# Patient Record
Sex: FEMALE | Race: WHITE | NOT HISPANIC OR LATINO | URBAN - METROPOLITAN AREA
[De-identification: names, ages, dates, MRNs, and addresses within clinical notes are randomized per-mention and may not be internally consistent; named-entity substitution may affect disease eponyms.]

---

## 2019-07-26 ENCOUNTER — EMERGENCY (EMERGENCY)
Facility: HOSPITAL | Age: 44
LOS: 0 days | Discharge: HOME | End: 2019-07-26
Admitting: EMERGENCY MEDICINE
Payer: COMMERCIAL

## 2019-07-26 VITALS
SYSTOLIC BLOOD PRESSURE: 144 MMHG | TEMPERATURE: 98 F | DIASTOLIC BLOOD PRESSURE: 83 MMHG | OXYGEN SATURATION: 100 % | HEART RATE: 77 BPM | WEIGHT: 134.92 LBS | RESPIRATION RATE: 18 BRPM

## 2019-07-26 DIAGNOSIS — M25.561 PAIN IN RIGHT KNEE: ICD-10-CM

## 2019-07-26 DIAGNOSIS — M25.461 EFFUSION, RIGHT KNEE: ICD-10-CM

## 2019-07-26 DIAGNOSIS — Z88.2 ALLERGY STATUS TO SULFONAMIDES: ICD-10-CM

## 2019-07-26 DIAGNOSIS — M25.569 PAIN IN UNSPECIFIED KNEE: ICD-10-CM

## 2019-07-26 DIAGNOSIS — M79.672 PAIN IN LEFT FOOT: ICD-10-CM

## 2019-07-26 PROCEDURE — 99283 EMERGENCY DEPT VISIT LOW MDM: CPT

## 2019-07-26 PROCEDURE — 73562 X-RAY EXAM OF KNEE 3: CPT | Mod: 26,RT

## 2019-07-26 PROCEDURE — 73630 X-RAY EXAM OF FOOT: CPT | Mod: 26,LT

## 2019-07-26 RX ADMIN — Medication 500 MILLIGRAM(S): at 20:52

## 2019-07-26 NOTE — ED PROVIDER NOTE - PHYSICAL EXAMINATION
CONSTITUTIONAL: Well-appearing; well-nourished; in no apparent distress.   MS: +pain with rom right knee and swelling; +swelling to dorsal aspect of left foot and pain with flexion/extension; No evidence of trauma or deformity. otherwise normal ROM in all other joints/extremities; non-tender to palpation; distal pulses are normal.   SKIN: Normal for age and race; warm; dry; good turgor; no apparent lesions or exudate.   NEURO/PSYCH: A & O x 4; grossly unremarkable. mood and manner are appropriate. Grooming and personal hygiene are appropriate. No apparent thoughts of harm to self or others.

## 2019-07-26 NOTE — ED PROCEDURE NOTE - NS ED PERI NEURO NEG
Post-application: Motor, sensory, and vascular responses intact in the injured extremity./The patient/caregiver verbalized understanding of how to care for the injured extremity with splint/Pre-application: Motor, sensory, and vascular responses intact in the injured extremity.
Pre-application: Motor, sensory, and vascular responses intact in the injured extremity./Post-application: Motor, sensory, and vascular responses intact in the injured extremity./The patient/caregiver verbalized understanding of how to care for the injured extremity with splint

## 2019-07-26 NOTE — ED ADULT TRIAGE NOTE - CHIEF COMPLAINT QUOTE
Patient states she has water in her right knee, and left upper foot swelling. denies any injury or trauma to area.

## 2019-07-26 NOTE — ED PROVIDER NOTE - CARE PROVIDERS DIRECT ADDRESSES
[email or web address], [email or web address].\A [email or web address] [place] [email or web address]

## 2019-07-26 NOTE — ED PROVIDER NOTE - OBJECTIVE STATEMENT
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

## 2019-07-26 NOTE — ED PROVIDER NOTE - NSFOLLOWUPINSTRUCTIONS_ED_ALL_ED_FT
Knee Pain    Knee pain is a very common symptom and can have many causes. Knee pain often goes away when you follow your health care provider's instructions for relieving pain and discomfort at home. However, knee pain can develop into a condition that needs treatment. Some conditions may include:     Arthritis caused by wear and tear (osteoarthritis).  Arthritis caused by swelling and irritation (rheumatoid arthritis or gout).  A cyst or growth in your knee.  An infection in your knee joint.  An injury that will not heal.  Damage, swelling, or irritation of the tissues that support your knee (torn ligaments or tendinitis).    If your knee pain continues, additional tests may be ordered to diagnose your condition. Tests may include X-rays or other imaging studies of your knee. You may also need to have fluid removed from your knee. Treatment for ongoing knee pain depends on the cause, but treatment may include:    Medicines to relieve pain or swelling.  Steroid injections in your knee.  Physical therapy.  Surgery.    HOME CARE INSTRUCTIONS  Take medicines only as directed by your health care provider.   Rest your knee and keep it raised (elevated) while you are resting.  Do not do things that cause or worsen pain.  Avoid high-impact activities or exercises, such as running, jumping rope, or doing jumping jacks.  Apply ice to the knee area:  Put ice in a plastic bag.  Place a towel between your skin and the bag.  Leave the ice on for 20 minutes, 2–3 times a day.  Ask your health care provider if you should wear an elastic knee support.  Keep a pillow under your knee when you sleep.  Lose weight if you are overweight. Extra weight can put pressure on your knee.  Do not use any tobacco products, including cigarettes, chewing tobacco, or electronic cigarettes. If you need help quitting, ask your health care provider. Smoking may slow the healing of any bone and joint problems that you may have.    SEEK MEDICAL CARE IF:  Your knee pain continues, changes, or gets worse.  You have a fever along with knee pain.  Your knee gorge or locks up.  Your knee becomes more swollen.    SEEK IMMEDIATE MEDICAL CARE IF:  Your knee joint feels hot to the touch.  You have chest pain or trouble breathing.    ADDITIONAL NOTES AND INSTRUCTIONS    Please follow up with your Primary MD in 24-48 hr.  Seek immediate medical care for any new/worsening signs or symptoms.   Foot Pain    Many things can cause foot pain. Some common causes are:     An injury.  A sprain.  Arthritis.  Blisters.  Bunions.    HOME CARE INSTRUCTIONS  Pay attention to any changes in your symptoms. Take these actions to help with your discomfort:    If directed, put ice on the affected area:  Put ice in a plastic bag.  Place a towel between your skin and the bag.  Leave the ice on for 15–20 minutes, 3?4 times a day for 2 days.  Take over-the-counter and prescription medicines only as told by your health care provider.  Wear comfortable, supportive shoes that fit you well. Do not wear high heels.  Do not stand or walk for long periods of time.  Do not lift a lot of weight. This can put added pressure on your feet.  Do stretches to relieve foot pain and stiffness as told by your health care provider.  Rub your foot gently.  Keep your feet clean and dry.    SEEK MEDICAL CARE IF:  Your pain does not get better after a few days of self-care.  Your pain gets worse.  You cannot stand on your foot.    SEEK IMMEDIATE MEDICAL CARE IF:  Your foot is numb or tingling.  Your foot or toes are swollen.  Your foot or toes turn white or blue.  You have warmth and redness along your foot.    ADDITIONAL NOTES AND INSTRUCTIONS    Please follow up with your Primary MD in 24-48 hr.  Seek immediate medical care for any new/worsening signs or symptoms.

## 2019-07-26 NOTE — ED PROVIDER NOTE - CARE PROVIDER_API CALL
Brandon Mcrae (MD)  Orthopaedic Surgery  [address] [place] [address]  Phone: [phone]  Fax: [phone]  Follow Up Time:

## 2019-07-26 NOTE — ED PROCEDURE NOTE - CPROC ED TIME OUT STATEMENT1
“Patient's name, , procedure and correct site were confirmed during the [place] Timeout.”
“Patient's name, , procedure and correct site were confirmed during the [place] Timeout.”

## 2019-07-26 NOTE — ED PROCEDURE NOTE - NS ED PERI VASCULAR NEG
no swelling/no paresthesia/no cyanosis of extremity/capillary refill time < 2 seconds/fingers/toes warm to touch
no paresthesia/no swelling/no cyanosis of extremity/capillary refill time < 2 seconds/fingers/toes warm to touch

## 2019-07-26 NOTE — ED PROCEDURE NOTE - CPROC ED POST PROC CARE GUIDE1
Elevate the injured extremity as instructed./Keep the cast/splint/dressing clean and dry./Instructed patient/caregiver to follow-up with primary care physician./Verbal/written post procedure instructions were given to patient/caregiver./Instructed patient/caregiver regarding signs and symptoms of infection.
Keep the cast/splint/dressing clean and dry./Instructed patient/caregiver to follow-up with primary care physician./Instructed patient/caregiver regarding signs and symptoms of infection./Elevate the injured extremity as instructed./Verbal/written post procedure instructions were given to patient/caregiver.

## 2020-02-14 ENCOUNTER — TRANSCRIPTION ENCOUNTER (OUTPATIENT)
Age: 45
End: 2020-02-14

## 2021-09-18 ENCOUNTER — TRANSCRIPTION ENCOUNTER (OUTPATIENT)
Age: 46
End: 2021-09-18

## 2022-06-15 PROBLEM — Z78.9 OTHER SPECIFIED HEALTH STATUS: Chronic | Status: ACTIVE | Noted: 2019-07-26

## 2022-06-27 ENCOUNTER — NON-APPOINTMENT (OUTPATIENT)
Age: 47
End: 2022-06-27

## 2022-07-06 ENCOUNTER — APPOINTMENT (OUTPATIENT)
Dept: ORTHOPEDIC SURGERY | Facility: CLINIC | Age: 47
End: 2022-07-06

## 2022-07-19 ENCOUNTER — FORM ENCOUNTER (OUTPATIENT)
Age: 47
End: 2022-07-19

## 2022-08-23 ENCOUNTER — APPOINTMENT (OUTPATIENT)
Dept: PAIN MANAGEMENT | Facility: CLINIC | Age: 47
End: 2022-08-23

## 2022-08-24 ENCOUNTER — APPOINTMENT (OUTPATIENT)
Dept: ORTHOPEDIC SURGERY | Facility: CLINIC | Age: 47
End: 2022-08-24

## 2022-08-25 ENCOUNTER — APPOINTMENT (OUTPATIENT)
Dept: PAIN MANAGEMENT | Facility: CLINIC | Age: 47
End: 2022-08-25

## 2022-08-30 ENCOUNTER — APPOINTMENT (OUTPATIENT)
Dept: PAIN MANAGEMENT | Facility: CLINIC | Age: 47
End: 2022-08-30

## 2022-10-05 ENCOUNTER — APPOINTMENT (OUTPATIENT)
Dept: ORTHOPEDIC SURGERY | Facility: CLINIC | Age: 47
End: 2022-10-05

## 2022-10-05 ENCOUNTER — NON-APPOINTMENT (OUTPATIENT)
Age: 47
End: 2022-10-05

## 2022-10-05 VITALS — BODY MASS INDEX: 20.94 KG/M2 | HEIGHT: 67.5 IN | WEIGHT: 135 LBS

## 2022-10-05 DIAGNOSIS — Z00.00 ENCOUNTER FOR GENERAL ADULT MEDICAL EXAMINATION W/OUT ABNORMAL FINDINGS: ICD-10-CM

## 2022-10-05 PROCEDURE — 99072 ADDL SUPL MATRL&STAF TM PHE: CPT | Mod: ACP

## 2022-10-05 PROCEDURE — 73110 X-RAY EXAM OF WRIST: CPT | Mod: ACP,LT

## 2022-10-05 PROCEDURE — 99213 OFFICE O/P EST LOW 20 MIN: CPT | Mod: ACP

## 2022-10-05 NOTE — PHYSICAL EXAM
[de-identified] :   Physical exam of her left wrist:  Negative swelling or ecchymosis.  Nontender over the distal radius or distal ulna.  Negative anatomical snuffbox tenderness.  She has some mild pain over the TFCC and ECU tendon.  She has full range of motion of the wrist with mild pain with ulnar deviation.   strength is 5/5.  Sensory and motor are intact.

## 2022-10-05 NOTE — WORK
[Mild Partial] : mild partial [Does not reveal pre-existing condition(s) that may affect treatment/prognosis] : does not reveal pre-existing condition(s) that may affect treatment/prognosis [Cannot return to work because ________] : cannot return to work because [unfilled] [Patient] : patient [No Rx restrictions] : No Rx restrictions. [I provided the services listed above] :  I provided the services listed above. [FreeTextEntry1] : good [FreeTextEntry3] : PEPPER

## 2022-10-05 NOTE — DISCUSSION/SUMMARY
[de-identified] :   She is almost a year status post her injury.\par She is still having ulnar-sided pain.  She has been doing therapy.\par I recommend an MRI to rule out a TFCC tear.  \par I recommend a wrist widget to try to alleviate some of her symptoms.\par She will see Dr. Ferreira for repeat evaluation in about a month after the MRI is completed.\par She has a mild partial disability secondary to wrist, however she is currently totally disabled secondary to her neck as being treated by Dr. Ley.\par All questions were answered today.

## 2022-10-05 NOTE — DATA REVIEWED
[FreeTextEntry1] :   X-rays repeated in the office today of her left wrist show a chronic healing avulsion fracture of the ulna styloid.

## 2022-10-05 NOTE — HISTORY OF PRESENT ILLNESS
[de-identified] :   Patient is a 47-year-old female here for repeat evaluation of her left wrist.  She is almost 1 year  status post ulnar styloid fracture that occurred at work.  She is having continued pain with certain motions on the ulnar side of the wrist.  She has been doing therapy with minimal relief.  She has been back and forth between [place] and [place] since her mother is sick in [place].

## 2022-10-06 ENCOUNTER — APPOINTMENT (OUTPATIENT)
Dept: PAIN MANAGEMENT | Facility: CLINIC | Age: 47
End: 2022-10-06

## 2022-10-06 PROCEDURE — 93770 DETERMINATION VENOUS PRESS: CPT

## 2022-10-06 PROCEDURE — 93040 RHYTHM ECG WITH REPORT: CPT | Mod: 59

## 2022-10-06 PROCEDURE — 72040 X-RAY EXAM NECK SPINE 2-3 VW: CPT

## 2022-10-06 PROCEDURE — 99072 ADDL SUPL MATRL&STAF TM PHE: CPT

## 2022-10-06 PROCEDURE — 99152Z: CUSTOM

## 2022-10-06 PROCEDURE — 62321 NJX INTERLAMINAR CRV/THRC: CPT

## 2022-10-06 NOTE — PROCEDURE
[FreeTextEntry1] : CERVICAL EPIDURAL STEROID INJECTION   [FreeTextEntry3] : Date:  10/06/2022\par \par Patient: JONATHAN HOPE\par \par :  1975\par \par \par \par \par \par Preoperative Diagnosis: Cervical radiculopathy\par Postoperative Diagnosis: Cervical radiculopathy\par \par \par Procedure:\par 1. Interlaminar C7-T1 Cervical Epidural Injection under fluoroscopy\par 2. Epidurography\par 3. Fluoroscopic guidance and localization of needle\par \par \par Physician: Ravinder Ley M.D. \par \par Anesthesia: see nurses notes, IV sedation , Versed 2mg, Fentanyl 50mcg\par \par \par Medical Necessity:  Failure of conservative management.\par Indication for Fluoroscopy:  This procedure requires the precise placement of the spinal needle into the epidural space.  It is the only way to accurately and safely perform the injection.\par Consent:  Though unusual, the possible complications including infection, bleeding, nerve damage, hospital admission, stroke, pneumothorax, death or failure of the procedure are theoretically possible. The patient was educated about the of the procedure and alternative therapies. All questions were answered and the patient freely gave consent to proceed.\par The patient was also told that sometimes patients will notice upper and/or lower extremity weakness immediately following the procedure due to extravasation of local anesthetic solution onto the main nerve root during the procedure. In addition, the patient was informed of other possible complications such as phrenic nerve injury, weak/heavy head, or muscle injury.  Lastly, the patient was informed that 1 to 2 weeks of perioperative discomfort following the procedure is to be expected.\par \par \par Monitoring:  Patient had continuous blood pressure, EKG, and pulse oximetry throughout the case. See nurse's notes.\par \par \par \par PROCEDURE NOTE: After obtaining written consent, the patient was positioned prone on the operating table. The back to her neck and upper thorax was prepped with Betadine and draped in usual sterile fashion.  A time out was performed.  The C7-T1 interspace was identified using fluoroscopy. The skin was infiltrated with lidocaine 2% -- 1 cc for subcutaneous analgesia.  The epidural space was identified using a 18g touhy needle with a midline approach using a loss of resistance technique. 2cc omnipaque was used to define the space. A solution of 5 ml of preservative-free sterile saline and 1ml of dexamethasone 10mg, 1cc was infused with minimal pressure on the syringe into the epidural space.  The needle tract and tubing were cleared with 2ml of preservative free normal saline. The procedure was tolerated well. There was no evidence of CSF, Paresthesias nor heme. \par \par \par Epidurogram: Distal and proximal spread was noted.\par Findings: Cervical Spine AP and oblique views with x-ray degenerative changes noted.\par \par Complications: none. \par \par Disposition: I have examined the patient and there are no new physical findings since original presentation. The patient was discharged home with a . The discharge instruction sheet was given to the patient. Motor function was intact.\par \par \par \par Comment: 1st LISBETH today, depending on effectiveness would schedule 2nd LISBETH in 1-2 weeks vs caudal epidural steroid vs follow up in office. Call if any problems\par \par \par \par This document was signed by:\par \par Ravinder Ley MD \par Board Certified, Anesthesiology \par Board Certified, Pain Medicine \par \par

## 2022-11-08 ENCOUNTER — APPOINTMENT (OUTPATIENT)
Dept: ORTHOPEDIC SURGERY | Facility: CLINIC | Age: 47
End: 2022-11-08

## 2022-11-08 DIAGNOSIS — S52.615D NONDISPLACED FRACTURE OF LEFT ULNA STYLOID PROCESS, SUBSEQUENT ENCOUNTER FOR CLOSED FRACTURE WITH ROUTINE HEALING: ICD-10-CM

## 2022-11-08 PROCEDURE — 99214 OFFICE O/P EST MOD 30 MIN: CPT

## 2022-11-08 PROCEDURE — 99072 ADDL SUPL MATRL&STAF TM PHE: CPT

## 2022-11-09 PROBLEM — S52.615D CLOSED NONDISPLACED FRACTURE OF STYLOID PROCESS OF LEFT ULNA WITH ROUTINE HEALING, SUBSEQUENT ENCOUNTER: Status: ACTIVE | Noted: 2022-10-05

## 2022-11-09 NOTE — ASSESSMENT
[FreeTextEntry1] : Patient comes for follow-up of her left wrist.  She says she is doing better.  Her pain is a lot better than prior.  She has pain on the ulnar aspect of her wrist at times.  There is only with certain things that bother her.  She states that she is doing relatively well.  She is currently not working because of her neck.\par \par   Left wrist:  nontender to palpation over SL, LT, slightly tender will patient over TFCC,   no pain with ulnar deviation, mild pain with supination, no pain with shucking of the DRUJ, full range of motion of fingers, neurovascularly intact\par \par   The MRI of the wrist shows mild ulnar-sided edema, possible ulnar styloid fracture, DRUJ effusion, mild arthritis\par \par  patient has some mild ulnar-sided wrist pain.  It is getting significantly better.  She says she does not have pain at all times.  She is going to continue dealing with that as needed.  She will follow up with me for this is needed.  She is currently not working secondary to her neck.

## 2022-11-09 NOTE — WORK
[Mild Partial] : mild partial [Cannot return to work because ________] : cannot return to work because [unfilled] [I provided the services listed above] :  I provided the services listed above. [FreeTextEntry1] : fair [FreeTextEntry3] : lg

## 2022-11-10 ENCOUNTER — APPOINTMENT (OUTPATIENT)
Dept: PAIN MANAGEMENT | Facility: CLINIC | Age: 47
End: 2022-11-10

## 2022-11-10 VITALS — HEART RATE: 83 BPM | DIASTOLIC BLOOD PRESSURE: 106 MMHG | SYSTOLIC BLOOD PRESSURE: 168 MMHG

## 2022-11-10 PROCEDURE — 99215 OFFICE O/P EST HI 40 MIN: CPT

## 2022-11-10 PROCEDURE — 99072 ADDL SUPL MATRL&STAF TM PHE: CPT

## 2022-11-10 NOTE — PHYSICAL EXAM
[de-identified] : Constitutional\par GENERAL APPEARANCE OF PATIENT IS WELL DEVELOPED, WELL NOURISHED, BODY HABITUS NORMAL, WELL GROOMED, NO DEFORMITIES NOTED. \par \par Head\par -          Atraumatic and Normocephalic \par \par Eyes, Nose, and Throat:\par -          External inspection of ears and nose are normal overall without scars, lesions, or masses noted\par -          Assessment of hearing is normal\par \par Neck\par -          Examination of neck shows no masses, overall appearance is normal, neck is symmetric, tracheal position is midline, no crepitus is noted\par -          Examination of thyroid shows no enlargement, tenderness or masses\par \par Respiratory\par -          Assessment of respiratory effort shows no intercostal retractions, no use of accessory muscles, unlabored breathing, and normal diaphragmatic movement.\par \par Cardiovascular\par -          Examination of extremities show no edema or varicosities\par \par Musculoskeletal\par -           Inspection and palpation of digits and nails shows no clubbing, cyanosis, nodules, drainage, fluctuance, petechiae\par \par 1)         Spine- inspection and palpation shows no misalignment, asymmetry, crepitation, defects, tenderness, masses, effusions. ROM is normal without crepitation or contracture. No instability or subluxation or laxity is noted. No abnormal movements.\par \par 2)         Neck- tenderness into the cervical paraspinals. ROM Restricted. Pain with flexion. Positive spurling bilaterally. \par \par 3)         RUE- inspection and palpation shows no misalignment, asymmetry, crepitation, defects, tenderness, masses, effusions. ROM is normal without crepitation or contracture. No instability or subluxation or laxity is noted. No abnormal movements.\par \par 4)         LUE-inspection and palpation shows no misalignment, asymmetry, crepitation, defects, tenderness, masses, effusions. ROM is normal without crepitation or contracture. No instability or subluxation or laxity is noted. No abnormal movements.\par \par 5)         RLE- inspection and palpation shows no misalignment, asymmetry, crepitation, defects, tenderness, masses, effusions. ROM is normal without crepitation or contracture. No instability or subluxation or laxity is noted. No abnormal movements.\par \par 6)         LLE-inspection and palpation shows no misalignment, asymmetry, crepitation, defects, tenderness, masses, effusions. ROM is normal without crepitation or contracture. No instability or subluxation or laxity is noted. No abnormal movements.\par \par Skin\par -           Inspection of skin and subcutaneous tissue shows no rashes, lesions or ulcers\par -           Palpation of skin and subcutaneous tissue shows no rashes, no indurations, subcutaneous nodules or tightening.\par \par  Abdomen\par -           Soft; Non-tender\par \par Neurologic\par -           CN 2-12 are grossly intact\par -           No sensory or motor deficits in the upper and lower extremities\par -           Adequate strength in upper and lower extremities\par \par Psychiatric\par -           Patients judgment and insight are intact\par -           Oriented to time, place and person\par -           Recent and remote memory intact.\par
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
## 2022-11-10 NOTE — ASSESSMENT
[FreeTextEntry1] : 47 year old female presenting with ongoing severe cervical radicular pain. Patient is presenting with radicular pain with impairment in ADLs and functionality.  The pain has not responded to conservative care, including medications, stretching, as well as active modalities, such as physical therapy.  Imaging studies as well as physical exam findings corroborate the symptomatology and radicular pain.  We will proceed with an epidural at this point. I will proceed with a cervical epidural steroid injection with sedation. I will also have her see Dr Zapata for surgical input. Follow up in 6 weeks will be made for reassessment.\par \par Patient had a MRI that shows a radicular component along with pain referred into the upper extremity. Patient has trialed rehab (Home exercise, physical therapy or chiropractic care) and medications. I will schedule a C7-T1 cervical epidural steroid injection.\par \par Risk, benefits, pros and cons of procedure were explained to the patient using models and diagrams and their questions were answered. \par \par \par The patient has severe anxiety of procedures that necessitates monitored anesthesia care (MAC). The procedure performed will be close to major nerves, arteries, and spinal cord and/or joint structures. Due to the proximity of these structures, we need the patient to be still during the procedure.  With the help of MAC, this will be safely achieved and decrease the risk of any complications.\par \par Disability status:\par Temporary total disability. Patient is unable to return to work at this time.\par \par Entered by Payal Nieto, acting as scribe for Dr. Ley.\par  \par The documentation recorded by the scribe, in my presence, accurately reflects the service I personally performed, and the decisions made by me with my edits as appropriate.\par  \par Best Regards, \par Ravinder Ley MD \par Board Certified, Anesthesiology \par Board Certified, Pain Medicine\par \par \par

## 2022-11-10 NOTE — DATA REVIEWED
[FreeTextEntry1] : MRI of the cervical spine taken on 1- showed left paracentral broad-based disc herniation C3-4 indenting the thecal sac and foramina, abutting the exiting bilateral C4 nerve roots. Left paracentral broad-based disc herniation at C7-T1 indenting the thecal sac and foramina, abutting the exiting left-sided nerve root. Disc bulge at C4-5 causing spinal canal stenosis, cord impingement, and foraminal narrowing, abutting the exiting C5 nerve roots with myelomalacia. Disc bulges at C5-6 and C6-7 causing spinal canal stenosis and foraminal narrowing, abutting the exiting bilateral C6 and C7 nerve roots. Straightening of the normal lordosis, which may be secondary to spasm.\par \par SOAPP: Scored a __ , __ risk.\par  \par [place] REGISTRY: Reviewed .  \par  \par UDS: No data obtained today. \par  \par Medications that trigger a UDS: Benzodiazepines (Ativan, Xanax, Valium) etc, Barbiturates, Narcotics (Avinza, Butrans, hydrocodone, Codeine, Negin, Methadone, Morphine, MS Contin, Opana, oxycodone, Oxycontin, Suboxone etc), Pregabalin (Lyrica), Tramadol (Ultacet, Utram etc), Tapentadol, (Nucynta) and Elist Drugs (cocaine, THC, Etc.)\par  \par Risk factors: Bipolar Illness, positive for any an illicit drugs, history of any ETOH and drug abuse, any signs of diversion, Sharing Meds, selling meds. Non consistent [place] drug reporting and above 120meq of morphine\par  \par Low risk: Patient has combination of a low risk SOAP and no risk factors. UDS would be repeated randomly every quarter\par

## 2022-11-21 ENCOUNTER — FORM ENCOUNTER (OUTPATIENT)
Age: 47
End: 2022-11-21

## 2023-01-03 ENCOUNTER — NON-APPOINTMENT (OUTPATIENT)
Age: 48
End: 2023-01-03

## 2023-01-03 ENCOUNTER — APPOINTMENT (OUTPATIENT)
Dept: NEUROSURGERY | Facility: CLINIC | Age: 48
End: 2023-01-03
Payer: OTHER MISCELLANEOUS

## 2023-01-03 PROCEDURE — 99204 OFFICE O/P NEW MOD 45 MIN: CPT

## 2023-01-03 PROCEDURE — 99072 ADDL SUPL MATRL&STAF TM PHE: CPT
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
## 2023-01-03 NOTE — ASSESSMENT
[FreeTextEntry1] : 46 yo F presents for neurosurgical consultation with regards to cervical radicular complaints stemming from a work related injury as mentioned above.\par \par Updated MR C spine warranted for my review. Films provided for review are 1 year old. Also, the films are very difficult to interpret considering the presence of motion artifact.\par \par She has been encouraged to consider a repeat cervical epidural as this form of treatment has provided relief in the past. Conservative efforts can continue as patient found relief through chiropractic manipulation in the past.\par \par Patient is to return to the office in 4-6 weeks to review films and to devise a treatment plan moving forward.\par \par Honey Graves PA-C

## 2023-01-04 RX ORDER — TIZANIDINE 4 MG/1
4 TABLET ORAL
Qty: 30 | Refills: 3 | Status: ACTIVE | COMMUNITY
Start: 2022-10-06 | End: 1900-01-01

## 2023-01-12 ENCOUNTER — FORM ENCOUNTER (OUTPATIENT)
Age: 48
End: 2023-01-12

## 2023-01-31 ENCOUNTER — APPOINTMENT (OUTPATIENT)
Dept: PAIN MANAGEMENT | Facility: CLINIC | Age: 48
End: 2023-01-31

## 2023-02-07 RX ORDER — METHYLPREDNISOLONE 4 MG/1
4 TABLET ORAL
Qty: 1 | Refills: 0 | Status: ACTIVE | COMMUNITY
Start: 2023-02-07 | End: 1900-01-01

## 2023-03-02 ENCOUNTER — RESULT REVIEW (OUTPATIENT)
Age: 48
End: 2023-03-02

## 2023-03-02 ENCOUNTER — OUTPATIENT (OUTPATIENT)
Dept: OUTPATIENT SERVICES | Facility: HOSPITAL | Age: 48
LOS: 1 days | End: 2023-03-02
Payer: COMMERCIAL

## 2023-03-02 ENCOUNTER — APPOINTMENT (OUTPATIENT)
Dept: PAIN MANAGEMENT | Facility: CLINIC | Age: 48
End: 2023-03-02
Payer: OTHER MISCELLANEOUS

## 2023-03-02 VITALS — WEIGHT: 135 LBS | HEIGHT: 67.5 IN | BODY MASS INDEX: 20.94 KG/M2

## 2023-03-02 DIAGNOSIS — M54.12 RADICULOPATHY, CERVICAL REGION: ICD-10-CM

## 2023-03-02 PROCEDURE — 99072 ADDL SUPL MATRL&STAF TM PHE: CPT

## 2023-03-02 PROCEDURE — 72141 MRI NECK SPINE W/O DYE: CPT | Mod: 26

## 2023-03-02 PROCEDURE — 72141 MRI NECK SPINE W/O DYE: CPT

## 2023-03-02 PROCEDURE — 99215 OFFICE O/P EST HI 40 MIN: CPT

## 2023-03-02 NOTE — DATA REVIEWED
[FreeTextEntry1] : \par [id number]  \par \par [id number] MRI of the cervical spine taken on 1- showed left paracentral broad-based disc herniation C3-4 indenting the thecal sac and foramina, abutting the exiting bilateral C4 nerve roots. Left paracentral broad-based disc herniation at C7-T1 indenting the thecal sac and foramina, abutting the exiting left-sided nerve root. Disc bulge at C4-5 causing spinal canal stenosis, cord impingement, and foraminal narrowing, abutting the exiting C5 nerve roots with myelomalacia. Disc bulges at C5-6 and C6-7 causing spinal canal stenosis and foraminal narrowing, abutting the exiting bilateral C6 and C7 nerve roots. Straightening of the normal lordosis, which may be secondary to spasm.\par

## 2023-03-02 NOTE — ASSESSMENT
[FreeTextEntry1] : Patient with severe cervical radiculopathy.  She is currently awaiting a new MRI of the cervical spine as per neurosurgery.  We will follow up with her after MRI is completed and she has seen Dr. Wilkerson.\par \par She is temporarily totally disabled.  She is 100% disabled.  She is not working\par \par Follow-up in 2 months

## 2023-03-02 NOTE — HISTORY OF PRESENT ILLNESS
[FreeTextEntry1] : She continues today with ongoing severe cervical radicular pain. She states the pain radiates into the bilateral upper extremities with associated numbness, tingling and spasms. She states she has pain which still radiates into the hands. She currently rates the pain at a 8/10 on the pain scale. She states the pain is constant in nature. Has seen NSx.  She states updated MRI pending.

## 2023-03-02 NOTE — PHYSICAL EXAM
[de-identified] : Neck- tenderness into the cervical paraspinals. ROM Restricted. Pain with flexion. Positive spurling bilaterally. \par

## 2023-03-02 NOTE — REASON FOR VISIT
[Follow-Up Visit] : a follow-up pain management visit [FreeTextEntry2] : BACK , NECK KNEE PAIN FOLLOW UP

## 2023-03-03 DIAGNOSIS — M54.12 RADICULOPATHY, CERVICAL REGION: ICD-10-CM

## 2023-04-26 ENCOUNTER — APPOINTMENT (OUTPATIENT)
Dept: NEUROSURGERY | Facility: CLINIC | Age: 48
End: 2023-04-26
Payer: OTHER MISCELLANEOUS

## 2023-04-26 PROCEDURE — 99442: CPT

## 2023-04-26 NOTE — REASON FOR VISIT
[Other Location: e.g. School (Enter Location, City,State)___] : at [unfilled], at the time of the visit. [Medical Office: ([facility])___] : at the medical office located in  [Verbal consent obtained from patient] : the patient, [unfilled] [Follow-Up: _____] : a [unfilled] follow-up visit

## 2023-04-26 NOTE — HISTORY OF PRESENT ILLNESS
[FreeTextEntry1] : This is a 47 yo F who presents for neurosurgical telemedicine encounter, MR WAN spine review. She is aware that there is evidence of spondylitic changes along with retrolisthesis at C6-7. Moderate-severe neuroforaminal narrowing noted at C4-7.\par \par Continued neck pain reported with associated radicular features into the bilateral upper extremities traveling into the 1-2 digits of the hands. She finds it difficult to remain seated due to heightened pain and often prefers to stand to alleviate the burden of her discomfort.\par \par Historically, she has done well with epidural procedures with the first epidural providing excellent sustained benefit, the second injection delivered less relief.\par \par MR WAN spine 3/2/2023- Multilevel cervical spondylosis, worse at C4-5 through C6-7 with moderate spinal stenosis and severe bilateral foraminal stenosis.\par \par Mild retrolisthesis of C6 on C7.\par \par PHYSICAL EXAM: not completed due to the virtual nature of our encounter.\par

## 2023-04-26 NOTE — ASSESSMENT
[FreeTextEntry1] : This is a 47 yo F who underwent a neurosurgical telemedicine encounter with regards to MR C Spine imaging review. She is aware of the spondylitic changes on imaging and evidence of moderate-severe neuroforaminal narrowing at C4-7. Considering the evidence of retrolisthesis at C6-7 she would be advised to proceed with a x-ray C spine flex/ext.\par \par She will be revisiting with Dr. Ley over the coming week and will consider a repeat LISBETH x1. \par \par After the above mentioned injection and imaging have been completed she will return to our office to discuss her complaints and to devise a continued treatment plan moving forward. All questions/concerns have been addressed and she is agreeable with the proposed course of action.\par \par Honey Graves PA-C

## 2023-05-08 ENCOUNTER — NON-APPOINTMENT (OUTPATIENT)
Age: 48
End: 2023-05-08

## 2023-05-22 ENCOUNTER — APPOINTMENT (OUTPATIENT)
Dept: PAIN MANAGEMENT | Facility: CLINIC | Age: 48
End: 2023-05-22
Payer: OTHER MISCELLANEOUS

## 2023-05-22 ENCOUNTER — OUTPATIENT (OUTPATIENT)
Dept: OUTPATIENT SERVICES | Facility: HOSPITAL | Age: 48
LOS: 1 days | End: 2023-05-22
Payer: COMMERCIAL

## 2023-05-22 ENCOUNTER — FORM ENCOUNTER (OUTPATIENT)
Age: 48
End: 2023-05-22

## 2023-05-22 VITALS
DIASTOLIC BLOOD PRESSURE: 108 MMHG | SYSTOLIC BLOOD PRESSURE: 150 MMHG | HEIGHT: 67 IN | BODY MASS INDEX: 21.19 KG/M2 | WEIGHT: 135 LBS | HEART RATE: 73 BPM

## 2023-05-22 DIAGNOSIS — M54.12 RADICULOPATHY, CERVICAL REGION: ICD-10-CM

## 2023-05-22 DIAGNOSIS — M50.00 CERVICAL DISC DISORDER WITH MYELOPATHY, UNSPECIFIED CERVICAL REGION: ICD-10-CM

## 2023-05-22 PROCEDURE — 72052 X-RAY EXAM NECK SPINE 6/>VWS: CPT | Mod: 26

## 2023-05-22 PROCEDURE — 72052 X-RAY EXAM NECK SPINE 6/>VWS: CPT

## 2023-05-22 PROCEDURE — 99215 OFFICE O/P EST HI 40 MIN: CPT

## 2023-05-22 NOTE — ASSESSMENT
[FreeTextEntry1] : 48 year old female presenting with severe cervical radiculopathy. Patient is presenting with radicular pain with impairment in ADLs and functionality.  The pain has not responded to conservative care, including medications, stretching, as well as active modalities, such as physical therapy.  Imaging studies as well as physical exam findings corroborate the symptomatology and radicular pain.  We will proceed with an epidural at this point. Follow up in 6 weeks will be made for reassessment. I have explained the findings to the patient and all questions have been answered. \par \par Patient had a MRI that shows a radicular component along with pain referred into the upper extremity. Patient has trialed rehab (Home exercise, physical therapy or chiropractic care) and medications. I will schedule a C7-T1 cervical epidural steroid injection.\par \par Risk, benefits, pros and cons of procedure were explained to the patient using models and diagrams and their questions were answered. \par \par \par The patient has severe anxiety of procedures that necessitates monitored anesthesia care (MAC). The procedure performed will be close to major nerves, arteries, and spinal cord and/or joint structures. Due to the proximity of these structures, we need the patient to be still during the procedure.  With the help of MAC, this will be safely achieved and decrease the risk of any complications.\par \par Disability status:\par Temporary total disability. Patient is unable to return to work at this time.\par \par Entered by Payal Nieto, acting as scribe for Dr. Ley.\par  \par The documentation recorded by the scribe, in my presence, accurately reflects the service I personally performed, and the decisions made by me with my edits as appropriate.\par  \par Best Regards, \par Ravinder Ley MD \par Board Certified, Anesthesiology \par Board Certified, Pain Medicine\par

## 2023-05-22 NOTE — HISTORY OF PRESENT ILLNESS
[FreeTextEntry1] : TODAY: Revisit encounter.\par \par She continues today with ongoing severe cervical radicular pain. She states the pain radiates into the bilateral upper extremities with associated numbness, tingling and spasms. She states she has pain which still radiates into the hands. She currently rates the pain at a 8/10 on the pain scale. She states the pain is constant in nature. Has seen NSx.

## 2023-05-22 NOTE — PHYSICAL EXAM
[de-identified] : Neck- tenderness into the cervical paraspinals. ROM Restricted. Pain with flexion. Positive spurling bilaterally. \par

## 2023-05-22 NOTE — DATA REVIEWED
[FreeTextEntry1] : MR C spine 3/2/2023- Multilevel cervical spondylosis, worse at C4-5 through C6-7 with moderate spinal stenosis and severe bilateral foraminal stenosis.\par \par Mild retrolisthesis of C6 on C7.\par \par  MRI of the cervical spine taken on 1- showed left paracentral broad-based disc herniation C3-4 indenting the thecal sac and foramina, abutting the exiting bilateral C4 nerve roots. Left paracentral broad-based disc herniation at C7-T1 indenting the thecal sac and foramina, abutting the exiting left-sided nerve root. Disc bulge at C4-5 causing spinal canal stenosis, cord impingement, and foraminal narrowing, abutting the exiting C5 nerve roots with myelomalacia. Disc bulges at C5-6 and C6-7 causing spinal canal stenosis and foraminal narrowing, abutting the exiting bilateral C6 and C7 nerve roots. Straightening of the normal lordosis, which may be secondary to spasm.\par

## 2023-05-23 DIAGNOSIS — M54.12 RADICULOPATHY, CERVICAL REGION: ICD-10-CM

## 2023-05-23 DIAGNOSIS — M50.00 CERVICAL DISC DISORDER WITH MYELOPATHY, UNSPECIFIED CERVICAL REGION: ICD-10-CM

## 2023-07-24 ENCOUNTER — APPOINTMENT (OUTPATIENT)
Dept: PAIN MANAGEMENT | Facility: CLINIC | Age: 48
End: 2023-07-24

## 2023-10-17 ENCOUNTER — APPOINTMENT (OUTPATIENT)
Dept: PAIN MANAGEMENT | Facility: CLINIC | Age: 48
End: 2023-10-17
Payer: OTHER MISCELLANEOUS

## 2023-10-17 VITALS
HEIGHT: 67 IN | WEIGHT: 135 LBS | DIASTOLIC BLOOD PRESSURE: 95 MMHG | BODY MASS INDEX: 21.19 KG/M2 | SYSTOLIC BLOOD PRESSURE: 165 MMHG | HEART RATE: 73 BPM

## 2023-10-17 PROCEDURE — 99215 OFFICE O/P EST HI 40 MIN: CPT

## 2023-10-19 ENCOUNTER — APPOINTMENT (OUTPATIENT)
Dept: PAIN MANAGEMENT | Facility: CLINIC | Age: 48
End: 2023-10-19
Payer: OTHER MISCELLANEOUS

## 2023-10-19 ENCOUNTER — APPOINTMENT (OUTPATIENT)
Dept: PAIN MANAGEMENT | Facility: CLINIC | Age: 48
End: 2023-10-19

## 2023-10-19 PROCEDURE — 62321 NJX INTERLAMINAR CRV/THRC: CPT

## 2023-10-19 PROCEDURE — 93770 DETERMINATION VENOUS PRESS: CPT

## 2023-10-19 PROCEDURE — 94761 N-INVAS EAR/PLS OXIMETRY MLT: CPT

## 2023-10-19 PROCEDURE — 72040 X-RAY EXAM NECK SPINE 2-3 VW: CPT

## 2023-10-19 PROCEDURE — 93040 RHYTHM ECG WITH REPORT: CPT | Mod: 59

## 2023-10-31 ENCOUNTER — APPOINTMENT (OUTPATIENT)
Dept: PAIN MANAGEMENT | Facility: CLINIC | Age: 48
End: 2023-10-31
Payer: OTHER MISCELLANEOUS

## 2023-10-31 PROCEDURE — ZZZZZ: CPT

## 2023-11-06 ENCOUNTER — APPOINTMENT (OUTPATIENT)
Dept: PAIN MANAGEMENT | Facility: CLINIC | Age: 48
End: 2023-11-06
Payer: OTHER MISCELLANEOUS

## 2023-11-06 DIAGNOSIS — M54.12 RADICULOPATHY, CERVICAL REGION: ICD-10-CM

## 2023-11-06 DIAGNOSIS — M50.00 CERVICAL DISC DISORDER WITH MYELOPATHY, UNSPECIFIED CERVICAL REGION: ICD-10-CM

## 2023-11-06 PROCEDURE — 99215 OFFICE O/P EST HI 40 MIN: CPT | Mod: 95

## 2023-11-20 ENCOUNTER — APPOINTMENT (OUTPATIENT)
Dept: PAIN MANAGEMENT | Facility: CLINIC | Age: 48
End: 2023-11-20
Payer: OTHER MISCELLANEOUS

## 2023-11-20 PROCEDURE — 99441: CPT

## 2024-06-04 ENCOUNTER — APPOINTMENT (OUTPATIENT)
Dept: PAIN MANAGEMENT | Facility: CLINIC | Age: 49
End: 2024-06-04

## 2024-06-26 ENCOUNTER — APPOINTMENT (OUTPATIENT)
Dept: ORTHOPEDIC SURGERY | Facility: CLINIC | Age: 49
End: 2024-06-26

## 2024-10-21 NOTE — ED PROCEDURE NOTE - NS_EDPROVIDERDISPOUSERTYPE_ED_A_ED
rapid heart beat
I have personally evaluated and examined the patient. The Attending was available to me as a supervising provider if needed.
I have personally evaluated and examined the patient. The Attending was available to me as a supervising provider if needed.